# Patient Record
Sex: MALE | Race: WHITE | NOT HISPANIC OR LATINO | URBAN - METROPOLITAN AREA
[De-identification: names, ages, dates, MRNs, and addresses within clinical notes are randomized per-mention and may not be internally consistent; named-entity substitution may affect disease eponyms.]

---

## 2023-12-26 ENCOUNTER — HOSPITAL ENCOUNTER (OUTPATIENT)
Dept: CARDIOLOGY | Facility: HOSPITAL | Age: 58
Discharge: HOME | End: 2023-12-26
Payer: MEDICARE

## 2023-12-26 DIAGNOSIS — Z95.0 CARDIAC PACEMAKER IN SITU: ICD-10-CM

## 2023-12-26 DIAGNOSIS — I44.2 ATRIOVENTRICULAR BLOCK, COMPLETE (MULTI): ICD-10-CM

## 2023-12-26 DIAGNOSIS — Z95.0 CARDIAC PACEMAKER IN SITU: Primary | ICD-10-CM

## 2023-12-26 PROCEDURE — 93294 REM INTERROG EVL PM/LDLS PM: CPT | Performed by: INTERNAL MEDICINE

## 2023-12-26 PROCEDURE — 93296 REM INTERROG EVL PM/IDS: CPT

## 2024-04-02 ENCOUNTER — HOSPITAL ENCOUNTER (OUTPATIENT)
Dept: CARDIOLOGY | Facility: HOSPITAL | Age: 59
Discharge: HOME | End: 2024-04-02
Payer: MEDICARE

## 2024-04-02 DIAGNOSIS — Z95.0 CARDIAC PACEMAKER IN SITU: ICD-10-CM

## 2024-04-02 DIAGNOSIS — I44.2 ATRIOVENTRICULAR BLOCK, COMPLETE (MULTI): ICD-10-CM

## 2024-04-02 PROCEDURE — 93294 REM INTERROG EVL PM/LDLS PM: CPT | Performed by: INTERNAL MEDICINE

## 2024-04-02 PROCEDURE — 93296 REM INTERROG EVL PM/IDS: CPT

## 2024-07-10 ENCOUNTER — HOSPITAL ENCOUNTER (OUTPATIENT)
Dept: CARDIOLOGY | Facility: HOSPITAL | Age: 59
Discharge: HOME | End: 2024-07-10
Payer: MEDICARE

## 2024-07-10 DIAGNOSIS — I44.2 ATRIOVENTRICULAR BLOCK, COMPLETE (MULTI): ICD-10-CM

## 2024-07-10 DIAGNOSIS — Z95.0 CARDIAC PACEMAKER IN SITU: ICD-10-CM

## 2024-07-10 PROCEDURE — 93296 REM INTERROG EVL PM/IDS: CPT

## 2024-09-27 ENCOUNTER — APPOINTMENT (OUTPATIENT)
Dept: CARDIOLOGY | Facility: CLINIC | Age: 59
End: 2024-09-27
Payer: MEDICARE

## 2024-12-20 ENCOUNTER — APPOINTMENT (OUTPATIENT)
Dept: CARDIOLOGY | Facility: CLINIC | Age: 59
End: 2024-12-20
Payer: MEDICARE

## 2024-12-23 ENCOUNTER — TRANSCRIBE ORDERS (OUTPATIENT)
Dept: CARDIOLOGY | Facility: CLINIC | Age: 59
End: 2024-12-23
Payer: MEDICARE

## 2024-12-23 DIAGNOSIS — Z95.0 CARDIAC PACEMAKER IN SITU: ICD-10-CM

## 2024-12-23 DIAGNOSIS — I44.2 ATRIOVENTRICULAR BLOCK, COMPLETE (MULTI): ICD-10-CM

## 2024-12-27 ENCOUNTER — APPOINTMENT (OUTPATIENT)
Dept: CARDIOLOGY | Facility: CLINIC | Age: 59
End: 2024-12-27
Payer: MEDICARE

## 2024-12-27 VITALS
HEART RATE: 87 BPM | SYSTOLIC BLOOD PRESSURE: 143 MMHG | WEIGHT: 167 LBS | BODY MASS INDEX: 24.73 KG/M2 | DIASTOLIC BLOOD PRESSURE: 75 MMHG | HEIGHT: 69 IN

## 2024-12-27 DIAGNOSIS — Z95.0 CARDIAC PACEMAKER IN SITU: ICD-10-CM

## 2024-12-27 DIAGNOSIS — I44.2 ATRIOVENTRICULAR BLOCK, COMPLETE (MULTI): ICD-10-CM

## 2024-12-27 DIAGNOSIS — Z95.0 CARDIAC PACEMAKER IN SITU: Primary | ICD-10-CM

## 2024-12-27 PROCEDURE — 99213 OFFICE O/P EST LOW 20 MIN: CPT | Performed by: INTERNAL MEDICINE

## 2024-12-27 PROCEDURE — 3008F BODY MASS INDEX DOCD: CPT | Performed by: INTERNAL MEDICINE

## 2024-12-27 RX ORDER — NAPROXEN SODIUM 220 MG/1
81 TABLET, FILM COATED ORAL
COMMUNITY
Start: 2024-05-28

## 2024-12-27 RX ORDER — ATORVASTATIN CALCIUM 20 MG/1
20 TABLET, FILM COATED ORAL DAILY
COMMUNITY

## 2024-12-27 RX ORDER — BISMUTH SUBSALICYLATE 262 MG
1 TABLET,CHEWABLE ORAL DAILY
COMMUNITY

## 2024-12-27 RX ORDER — METOPROLOL TARTRATE 25 MG/1
25 TABLET, FILM COATED ORAL 2 TIMES DAILY
COMMUNITY

## 2024-12-27 NOTE — PROGRESS NOTES
"  Subjective:  The patient is a 59-year-old white male, followed primarily by Dr. Radames Bernal and from a cardiology standpoint by Dr. Acacia Cheema, who presents again for pacemaker follow-up.  He has a history of a congenital bicuspid aortic valve and developed endocarditis with a septal abscess.  He underwent bioprosthetic aortic valve replacement at [facility] in May 2019, with aortic root reconstruction, septal reconstruction, and a single-vessel CABG (LIMA to LAD).  He had postoperative AV block, and I implanted a Medtronic DDDR pacemaker on 5/21/2019.  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  The patient's AV block never resolved and he is largely pacemaker-dependent.    The patient is currently doing well.  He is on full disability because of his cardiac issues.  He is  and lives at home with his wife and their 16-year-old daughter.  The patient drives his daughter to many of her activities, since she does not yet have a 's license.    Current Outpatient Medications   Medication Sig    aspirin 81 mg chewable tablet Chew 1 tablet (81 mg) once daily.    atorvastatin (Lipitor) 20 mg tablet Take 1 tablet (20 mg) by mouth once daily.    metoprolol tartrate (Lopressor) 25 mg tablet Take 1 tablet (25 mg) by mouth 2 times a day.    multivitamin tablet Take 1 tablet by mouth once daily.     Allergies:  Patient has no known allergies.     Past Surgical History:   Procedure Laterality Date    OTHER SURGICAL HISTORY  05/20/2022    Aortic valve replacement    OTHER SURGICAL HISTORY  05/20/2022    Pacemaker insertion     Objective:  Vitals:    12/27/24 0919   BP: 143/75   Pulse: 87   Height:     1.753 m (5' 9\")  Weight: 75.8 kg (167 lb)     Exam:  Gen: Bearded gentleman in no distress.  HEENT: Edentulous.  Neck: No jugular venous distention or thyromegaly.  Left " subclavian pacemaker pocket: Normal in appearance.  Chest: Old median sternotomy incisional scar.  Lungs: Clear to auscultation, with no wheezes or rales.  Heart: Regular rhythm with slightly hyperdynamic heart and rapid rate, with grade 1/6 systolic ejection murmur and preserved S2  Abdomen: Benign, with no organomegaly or masses.  Extremities: Intact distal pulses; no edema.  Neuro: No focal neurologic abnormalities.  Skin: No cutaneous lesions.    Device Check:  A Medtronic pacemaker check was done today.  The unit is programmed to the MVP mode but effectively provides DDDR pacing between 75 bpm and 110 bpm.  There is 70.5% atrial pacing and virtually 100% ventricular pacing.  The lead parameters were good.  The battery longevity is estimated at 4.2 years.  There was no burden of atrial fibrillation.    Impressions:  1.  Bicuspid aortic valve with prior endocarditis, status post aortic valve replacement in May 2019 with redo operation in June 2019.  2.  Complete heart block, with pacemaker-dependency.  3.  Status post Medtronic DDDR pacemaker in May 2019, just past the senior care point in battery longevity.  4.  Coronary artery disease, with LIMA to LAD at time of May 2019 surgery.  5.  Irritable bowel syndrome, followed by Dr. Radames Bernal.    Recommendations:  1.  The patient's device will be followed remotely every 3 months.  2.  He will follow-up in 8 to 12 months with Dr. Idris Solis, after which his device follow-up may be switched to the [facility] facility, which is closer to the patient's home in [place] than is any [place] facility.  3.  With any deterioration in LV systolic function related to RV pacing, conduction system pacing or biventricular pacing will need to be considered.      Seymour Mayen MD

## 2025-04-03 ENCOUNTER — HOSPITAL ENCOUNTER (OUTPATIENT)
Dept: CARDIOLOGY | Facility: HOSPITAL | Age: 60
Discharge: HOME | End: 2025-04-03
Payer: MEDICARE

## 2025-04-03 DIAGNOSIS — Z95.0 CARDIAC PACEMAKER IN SITU: ICD-10-CM

## 2025-04-03 DIAGNOSIS — I44.2 ATRIOVENTRICULAR BLOCK, COMPLETE (MULTI): ICD-10-CM

## 2025-04-03 PROCEDURE — 93296 REM INTERROG EVL PM/IDS: CPT

## 2025-07-14 ENCOUNTER — HOSPITAL ENCOUNTER (OUTPATIENT)
Dept: CARDIOLOGY | Facility: HOSPITAL | Age: 60
Discharge: HOME | End: 2025-07-14
Payer: MEDICARE

## 2025-07-14 DIAGNOSIS — Z95.0 CARDIAC PACEMAKER IN SITU: ICD-10-CM

## 2025-07-14 DIAGNOSIS — I44.2 ATRIOVENTRICULAR BLOCK, COMPLETE (MULTI): ICD-10-CM

## 2025-07-14 PROCEDURE — 93296 REM INTERROG EVL PM/IDS: CPT

## 2025-07-14 PROCEDURE — 93294 REM INTERROG EVL PM/LDLS PM: CPT | Performed by: INTERNAL MEDICINE

## 2025-12-15 ENCOUNTER — APPOINTMENT (OUTPATIENT)
Dept: CARDIOLOGY | Facility: CLINIC | Age: 60
End: 2025-12-15
Payer: MEDICARE